# Patient Record
Sex: MALE | Race: BLACK OR AFRICAN AMERICAN
[De-identification: names, ages, dates, MRNs, and addresses within clinical notes are randomized per-mention and may not be internally consistent; named-entity substitution may affect disease eponyms.]

---

## 2019-07-12 ENCOUNTER — HOSPITAL ENCOUNTER (INPATIENT)
Dept: HOSPITAL 83 - ED | Age: 74
LOS: 3 days | Discharge: HOME | DRG: 308 | End: 2019-07-15
Attending: INTERNAL MEDICINE | Admitting: INTERNAL MEDICINE
Payer: MEDICARE

## 2019-07-12 VITALS — DIASTOLIC BLOOD PRESSURE: 90 MMHG

## 2019-07-12 VITALS — DIASTOLIC BLOOD PRESSURE: 76 MMHG

## 2019-07-12 VITALS — WEIGHT: 208 LBS | HEIGHT: 71 IN | BODY MASS INDEX: 29.12 KG/M2

## 2019-07-12 VITALS — DIASTOLIC BLOOD PRESSURE: 78 MMHG

## 2019-07-12 VITALS — DIASTOLIC BLOOD PRESSURE: 72 MMHG | SYSTOLIC BLOOD PRESSURE: 115 MMHG

## 2019-07-12 VITALS — SYSTOLIC BLOOD PRESSURE: 104 MMHG | DIASTOLIC BLOOD PRESSURE: 68 MMHG

## 2019-07-12 VITALS — DIASTOLIC BLOOD PRESSURE: 72 MMHG

## 2019-07-12 DIAGNOSIS — I07.1: ICD-10-CM

## 2019-07-12 DIAGNOSIS — I48.91: Primary | ICD-10-CM

## 2019-07-12 DIAGNOSIS — I13.0: ICD-10-CM

## 2019-07-12 DIAGNOSIS — E83.51: ICD-10-CM

## 2019-07-12 DIAGNOSIS — E83.41: ICD-10-CM

## 2019-07-12 DIAGNOSIS — N18.3: ICD-10-CM

## 2019-07-12 DIAGNOSIS — D64.9: ICD-10-CM

## 2019-07-12 DIAGNOSIS — E80.6: ICD-10-CM

## 2019-07-12 DIAGNOSIS — Z90.79: ICD-10-CM

## 2019-07-12 DIAGNOSIS — Z79.82: ICD-10-CM

## 2019-07-12 DIAGNOSIS — I42.9: ICD-10-CM

## 2019-07-12 DIAGNOSIS — Z87.891: ICD-10-CM

## 2019-07-12 DIAGNOSIS — E78.5: ICD-10-CM

## 2019-07-12 DIAGNOSIS — Z71.41: ICD-10-CM

## 2019-07-12 DIAGNOSIS — Z82.3: ICD-10-CM

## 2019-07-12 DIAGNOSIS — R73.9: ICD-10-CM

## 2019-07-12 DIAGNOSIS — D72.810: ICD-10-CM

## 2019-07-12 DIAGNOSIS — F10.10: ICD-10-CM

## 2019-07-12 DIAGNOSIS — I50.21: ICD-10-CM

## 2019-07-12 DIAGNOSIS — Z82.49: ICD-10-CM

## 2019-07-12 DIAGNOSIS — Z79.899: ICD-10-CM

## 2019-07-12 LAB
ALBUMIN SERPL-MCNC: 4 GM/DL (ref 3.1–4.5)
ALP SERPL-CCNC: 79 U/L (ref 45–117)
ALT SERPL W P-5'-P-CCNC: 34 U/L (ref 12–78)
APTT PPP: 24.7 SECONDS (ref 20–32.1)
AST SERPL-CCNC: 30 IU/L (ref 3–35)
BASOPHILS # BLD AUTO: 0 10*3/UL (ref 0–0.1)
BASOPHILS NFR BLD AUTO: 0.5 % (ref 0–1)
BUN SERPL-MCNC: 23 MG/DL (ref 7–24)
CHLORIDE SERPL-SCNC: 111 MMOL/L (ref 98–107)
CREAT SERPL-MCNC: 1.51 MG/DL (ref 0.7–1.3)
EOSINOPHIL # BLD AUTO: 0.1 10*3/UL (ref 0–0.4)
EOSINOPHIL # BLD AUTO: 1.1 % (ref 1–4)
ERYTHROCYTE [DISTWIDTH] IN BLOOD BY AUTOMATED COUNT: 13.6 % (ref 0–14.5)
HCT VFR BLD AUTO: 38.4 % (ref 42–52)
HGB BLD-MCNC: 13 G/DL (ref 14–18)
INR BLD: 0.9 (ref 2–3.5)
LIPASE SERPL-CCNC: 173 U/L (ref 73–393)
LYMPHOCYTES # BLD AUTO: 1.1 10*3/UL (ref 1.3–4.4)
LYMPHOCYTES NFR BLD AUTO: 17.1 % (ref 27–41)
MCH RBC QN AUTO: 30.9 PG (ref 27–31)
MCHC RBC AUTO-ENTMCNC: 33.9 G/DL (ref 33–37)
MCV RBC AUTO: 91.2 FL (ref 80–94)
MONOCYTES # BLD AUTO: 0.8 10*3/UL (ref 0.1–1)
MONOCYTES NFR BLD MANUAL: 13 % (ref 3–9)
NEUT #: 4.2 10*3/UL (ref 2.3–7.9)
NEUT %: 68 % (ref 47–73)
NRBC BLD QL AUTO: 0 10*3/UL (ref 0–0)
PLATELET # BLD AUTO: 171 10*3/UL (ref 130–400)
PMV BLD AUTO: 10.6 FL (ref 9.6–12.3)
POTASSIUM SERPL-SCNC: 4 MMOL/L (ref 3.5–5.1)
PROT SERPL-MCNC: 7.1 GM/DL (ref 6.4–8.2)
RBC # BLD AUTO: 4.21 10*6/UL (ref 4.5–5.9)
SODIUM SERPL-SCNC: 142 MMOL/L (ref 136–145)
TROPONIN I SERPL-MCNC: < 0.015 NG/ML (ref ?–0.04)
WBC NRBC COR # BLD AUTO: 6.2 10*3/UL (ref 4.8–10.8)

## 2019-07-12 NOTE — NUR
PT WAS UP TO BATHROOM MONITOR TECH CALLED AND STATED THAT PT HR -160
FOR S SHORT PERIOD OF TIME. BY THE TIME THE RN GOT TO THE ROOM TO CHECK ON
PATIENT, PT WAS BACK IN BED. -130
PT STASTES ASYMPTOMATIC AT THIS TIME. WILL NOTIFY PHYSICIAN

## 2019-07-12 NOTE — PR
[place], [place]
 
                                      PROGRESS NOTE
 
        NAME: SERENA FRAUSTO               ACCT #: [id number]  
        UNIT #: [id number]                       ROOM: 425       
        DOCTOR: NATTY HAMLIN,SCOTTIE          BIRTHDATE: [birth year]
 
 
DOS: 07/14/2019
 
REASON FOR VISIT:  Atrial fibrillation and heart failure.
 
SUBJECTIVE:  The patient is feeling better.  Denies any chest pain, shortness of
breath.  No palpitation, dizziness, no syncope, no orthopnea.  No nausea,
vomiting, diarrhea.  No fever and chills.  No PND.
 
REVIEW OF SYSTEMS:  Review of 10 systems negative except as mentioned above.
 
RHYTHM STRIPS:  The patient is in atrial fibrillation with mostly controlled
ventricular rates.
 
PHYSICAL EXAMINATION:
VITAL SIGNS:  Blood pressure 142/90, pulse 68, respiratory rate is 20.  Weight
94.3 kilos.
GENERAL:  Alert, comfortable, in no acute distress.
HEENT:  Pupils are round and equal.  No jaundice.  Tongue was moist and pharynx
was clear.
NECK:  Supple, no distended neck veins, no carotid bruit.
CHEST:  Symmetrical, nontender.
LUNGS:  Good air entry bilaterally.
HEART:  Irregularly irregular, grade 1/6 systolic murmur.
ABDOMEN:  Benign, nontender.  Bowel sounds normal.
EXTREMITIES:  Showed no edema.  Distal pulses palpable.
SKIN:  Warm and dry.  No cyanosis, no clubbing.
RECTAL:  Deferred.
GENITOURINARY:  Deferred.
NEUROLOGIC:  The patient is alert with no focal neurologic deficit.
MUSCULOSKELETAL:  No joint tenderness or swelling.
 
MEDICATIONS AND LABORATORY DATA:  Reviewed.
 
IMPRESSION:
1.  Atrial fibrillation with rapid ventricular rate, new onset.
2.  Acute systolic heart failure.
3.  Chronic kidney disease.
4.  Hypertension.
5.  Alcohol abuse.
 
RECOMMENDATIONS:
1.  Continue current medications.
2.  Risk factor modification for diet, exercise, weight loss as well as to quit
drinking discussed.
3.  Lexiscan stress test was scheduled for tomorrow to rule out ischemia.
4.  If the patient remains in atrial fibrillation, then a LETICIA and DC
cardioversion were discussed for tomorrow and the risks, benefits, and
alternatives were discussed and the patient is agreeable.
5.  Continue the current cardiac medications.
6.  No family at bedside at time of my examination.
 
                              [place], [place]
 
                                      PROGRESS NOTE
 
        NAME: SERENA FRAUSTO               ACCT #: [id number]  
        UNIT #: [id number]                       ROOM: 425       
        DOCTOR: SCOTTIE LUZ MD          BIRTHDATE: [birth year]
 
 
 
 
 
_________________________________
SCOTTIE LUZ MD
 
CM:PNTRANS
 
D: 07/14/19 1927                 
T: 07/15/19 0209
             
                                                            
SCOTTIE LUZ MD          
                                                            
07/15/19
0207
                              
interface

## 2019-07-12 NOTE — EKG
[place], [place]
 
                               ELECTROCARDIOGRAM REPORT
 
        NAME: SERENA FRAUSTO                ACCT #: [id number]  
        UNIT #: [id number]                        ROOM: 425       
        DOCTOR: HOMER DRAFT REPORT          BIRTHDATE: [birth year]
 
 
 

 
 
                           [facility]
                                       
Test Date:    2019               Test Time:    16:35:18
Pat Name:     SERNEA FRAUSTO          Department:   
Patient ID:   [id number]             Room:         425
Gender:       M                        Technician:   Betina Mercado
:          1945               Requested By: YAIR HIGGINS
Order Number: [id number]      Reading MD:   Devante Ye MD
                                 Measurements
Intervals                              Axis          
Rate:         120                      P:            
PA:                                    QRS:          -53
QRSD:         98                       T:            31
QT:           335                                    
QTc:          474                                    
                           Interpretive Statements
Atrial fibrillation
Abnormal R-wave progression, late transition
Inferior infarct, old
Compared to ECG 2019 10:03:23
No significant changes
 
Electronically Signed On 2019 15:52:41 PDT by Devante Ye MD
 
CM:EKGRPT:ELECTROCARDIOGRAM REPORT
 
D: 19 1635
T: 19 1552
    
YAIR BUSH DRAFT REPORT         
YAIR HIGGINS DO

## 2019-07-12 NOTE — PR
[place], [place]
 
                                      PROGRESS NOTE
 
        NAME: SERENA FRAUSTO               ACCT #: [id number]  
        UNIT #: [id number]                       ROOM: 425       
        DOCTOR: SCOTTIE LUZ MD          BIRTHDATE: [birth year]
 
 
DOS: 07/13/2019
 
CARDIOLOGY FOLLOWUP VISIT
 
REASON FOR VISIT:  Atrial fibrillation and cardiomyopathy.
 
HISTORY OF PRESENT ILLNESS:  The patient is feeling better.  Denies any chest
pain, shortness of breath.  No palpitation, no dizziness, no PND, no orthopnea. 
No nausea, vomiting or diarrhea.
 
REVIEW OF SYSTEMS:  Review of 10 systems negative except as mentioned above.
 
PHYSICAL EXAMINATION:
VITAL SIGNS:  Blood pressure 130/60, pulse 100, respiratory rate was 18, weight
94.3 kg, BMI 29.
GENERAL:  Alert, comfortable, in no acute distress.
HEAD AND NECK:  Neck supple, no distended neck veins, no carotid bruit.
CHEST:  Symmetrical, nontender.
LUNGS:  Few scattered rhonchi.
HEART:  Irregularly irregular.  Grade 1/6 systolic murmur.
ABDOMEN:  Benign, nontender.  Bowel sounds normal.
EXTREMITIES:  Showed trace edema.  Distal pulses palpable.
SKIN:  Warm and dry.  No cyanosis, no clubbing.
RECTAL:  Deferred.
GENITOURINARY:  Deferred.
NEUROLOGIC:  The patient is alert with no focal neurologic deficit.
 
MEDICATIONS AND LABORATORIES:  Reviewed.
 
RHYTHM STRIPS:  The patient in atrial fibrillation.
 
IMPRESSION:
1.  Atrial fibrillation with rapid ventricular rate, new onset, currently rate
controlled.  Thyroid function tests are normal.  The patient was on Eliquis
anticoagulation.
2.  Acute systolic heart failure, currently stable.
3.  Hypertension.
4.  Chronic kidney disease.
5.  History of alcohol use.
 
RECOMMENDATIONS:
1.  Continue current medications.
2.  Risk factor modification discussed.  Lexiscan stress was scheduled for
Monday.
3.  The patient still in atrial fibrillation.  I would consider LETICIA-guided DC
cardioversion on Monday if schedule permits.
4.  No family at bedside.
5.  Above recommendation discussed with the patient.
 
 
 
                              [place], [place]
 
                                      PROGRESS NOTE
 
        NAME: SERENA FRAUSTO               ACCT #: [id number]  
        UNIT #: [id number]                       ROOM: 425       
        DOCTOR: SCOTTIE LUZ MD          BIRTHDATE: [birth year]
 
 
 
_________________________________
SCOTTIE LUZ MD
 
CM:MISSY
 
D: 07/13/19 2005                 
T: 07/14/19 0342
             
                                                            
SCOTTIE LUZ MD          
                                                            
07/14/19
0341
                              
interface

## 2019-07-12 NOTE — NUR
ASSUMED CARE OF PATIENT. PATIENT RESTING IN HIS BED AT THIS TIME. ALERT AND
ORIENTED. NO S/S OF DISTRESS. CALL LIGHT PLACED WITHIN REACH.

## 2019-07-12 NOTE — EKG
[place], [place]
 
                               ELECTROCARDIOGRAM REPORT
 
        NAME: SERENA FRAUSTO                ACCT #: [id number]  
        UNIT #: [id number]                        ROOM: 425       
        DOCTOR: HOMER DRAFT REPORT          BIRTHDATE: [birth year]
 
 
 

 
 
                           [facility]
                                       
Test Date:    2019               Test Time:    10:03:23
Pat Name:     SERENA FRAUSTO          Department:   
Patient ID:   [id number]             Room:         425
Gender:       M                        Technician:   Betina Mercado
:          1945               Requested By: YAIR HIGGINS
Order Number: [id number]      Reading MD:   Devante Ye MD
                                 Measurements
Intervals                              Axis          
Rate:         126                      P:            
DE:                                    QRS:          -46
QRSD:         99                       T:            43
QT:           328                                    
QTc:          475                                    
                           Interpretive Statements
Atrial fibrillation
Abnormal R-wave progression, late transition
Inferior infarct, old
No previous ECG available for comparison
 
Electronically Signed On 2019 13:25:40 PDT by Devante Ye MD
 
CM:EKGRPT:ELECTROCARDIOGRAM REPORT
 
D: 19 1003
T: 19 1325
    
YAIR BUSH DRAFT REPORT         
YAIR HIGGINS DO

## 2019-07-12 NOTE — O
[place], [place]
 
                                      OPERATIVE NOTE
 
        NAME: SERENA FRAUSTO                ACCT #: [id number]  
        UNIT #: [id number]                        ROOM: 425       
        DOCTOR: NATTY HAMLIN,SCOTTIE           BIRTHDATE: [birth year]
 
 
DOS: 07/15/2019
 
PROCEDURE:  DC cardioversion.
 
PREOPERATIVE DIAGNOSIS:  Atrial fibrillation.
 
POSTOPERATIVE DIAGNOSIS:  Atrial fibrillation.
 
IMMEDIATE COMPLICATIONS:  None.
 
SEDATION:  LMAC sedation.
 
CLINICAL HISTORY:  The patient is scheduled for LETICIA cardioversion due to his
symptomatic atrial fibrillation with rapid ventricular rate.  The patient was
anticoagulated with Eliquis.  Risks, complications, and alternatives were
discussed and informed consent obtained.
 
OPERATIVE REPORT:  The patient was brought to the operative room.  He underwent
a LETICIA to rule out intracardiac thrombus.  After sedation via anterior and
posterior cardioversion patches, the patient is at 200 joules of synchronized
biphasic current and was converted to sinus rhythm and maintained in sinus
rhythm.  The patient's heart rate, heart rhythm, and blood pressures were
closely monitored.  Post-cardioversion, the patient was alert and oriented. 
Vital signs were stable and no focal neurologic deficit.
 
CONCLUSIONS:  Successful conversion of atrial fibrillation to sinus rhythm with
single attempt of 200 joules of synchronized biphasic direct current.
 
 
 
_________________________________
SCOTTIE LUZ MD
 
CM:OPRECORD:OPERATIVE NOTE
 
D: 07/15/19 1246
T: 07/15/19 1259
    
                                                            
SCOTTIE LUZ MD          
                                                            
07/15/19
1257
                              
interface

## 2019-07-12 NOTE — NUR
A 73, admitted to , under the
services of SAI Carballo DO with a diagnosis of ATRIAL FIB.
Chief complaint is NO COMPLAINTS.
Patient arrived via stretcher from ER.
Monitor applied. Initial assessment completed.
Vital signs taken and recorded.
SAI CARBALLO DO notified of admission to the unit.
Orders received.
See assessment for past medical history, medications
and allergies.
Patient and/or family oriented to unit. [facility] reviewed.
Clothing/patient valuable form completed.
 
TERRY SALGUERO

## 2019-07-12 NOTE — ST
[place], [place]
 
                               EXERCISE STRESS TEST REPORT
 
        [facility]: [facility]ERENA FRAUSTO                [id number]  
        UNIT #: [id number]                        ROOM: 425       
        DOCTOR: NATTY HAMLIN,SCOTTIE           BIRTHDATE: [birth year]
 
 
DOS: 07/15/2019
 
LEXISCAN STRESS TEST
 
REASON FOR TEST:  Atrial fibrillation.
 
PHYSICAL EXAMINATION:
NECK:  Supple.
LUNGS:  Clear anteriorly.
HEART:  Irregular.
 
PROTOCOL:  Lexiscan protocol.  Maximum heart rate 142, peak blood pressure
108/52.
 
SYMPTOMS:  The patient is chest pain free.
 
EKG:  Resting EKG shows atrial fibrillation.  Stress EKG showed atrial
fibrillation with rapid ventricular rate.  No ischemia.
 
CONCLUSION:  Clinically, the patient is chest pain free.  EKG nonischemic,
underlying atrial fibrillation.
 
POST-STRESS COMPLICATIONS:  None.
 
The patient received a total of 0.4 mg of Lexiscan.
 
 
 
_________________________________
SCOTTIE LUZ MD
 
CM:STRESS:EXERCISE STRESS TEST REPORT 
 
 
D: 07/15/19 1247
T: 07/15/19 1327
    
                                                            
SCOTTIE LUZ MD

## 2019-07-12 NOTE — PR
[place], [place]
 
                                      PROGRESS NOTE
 
        NAME: SERENA FRAUSTO               ACCT #: [id number]  
        UNIT #: [id number]                       ROOM: 425       
        DOCTOR: NATTY HAMLIN,SCOTTIE          BIRTHDATE: [birth year]
 
 
DOS: 07/15/2019
 
REASON FOR VISIT:  Atrial fibrillation, heart failure.
 
SUBJECTIVE:  The patient is feeling better.  Denies any chest pain, shortness of
breath.  No palpitation or dizziness.  No PND, no orthopnea.  No nausea,
vomiting, diarrhea.  No fever and chills.  No musculoskeletal symptoms.  No
neurologic symptoms.  No genitourinary symptoms.
 
REVIEW OF SYSTEMS:  Review of 10 systems negative, except as mentioned above.
 
RHYTHM STRIPS:  The patient with atrial fibrillation with occasional rapid
ventricular rate.
 
PHYSICAL EXAMINATION:
VITAL SIGNS:  Blood pressure 120/87, pulse 72, respiratory rate 18, weight 90
kilos.
GENERAL:  Alert, comfortable, in no acute distress.
HEENT:  Pupils are round and equal.  No jaundice.
NECK:  Supple, no distended neck veins, no carotid bruit.
CHEST:  Symmetrical, nontender.
LUNGS:  Clear to auscultation bilaterally.
HEART:  Irregularly irregular, grade 1/6 systolic murmur.
ABDOMEN:  Benign, nontender.  Bowel sounds normal.
EXTREMITIES:  Showed no edema.  Distal pulses palpable.
SKIN:  Warm and dry.  No cyanosis, no clubbing.
RECTAL:  Deferred.
GENITOURINARY:  Deferred.
NEUROLOGIC:  The patient is alert with no focal neurologic deficit.
 
LABORATORY DATA:  Labs and medications reviewed.  Hemoglobin 12.9, creatinine
1.55.
 
IMPRESSION:
1.  New-onset atrial fibrillation with rapid ventricular rate.
2.  Acute systolic heart failure with ejection fraction 40-45% by echo.
3.  Hypertension.
4.  Chronic kidney disease.
5.  Alcohol use.
 
RECOMMENDATIONS:
1.  Continue current medication.
2.  Lexiscan stress test today to rule out ischemia.
3.  LETICIA, DC cardioversion later today due to his atrial fibrillation with rapid
ventricular rate.  
4.  Risks, benefits, and complications of LETICIA and cardioversion discussed and
the patient is agreeable. 
5.  If the stress test is unremarkable and if this cardioversion is successful,
possible discharge later today.
 
 
                              [place], [place]
 
                                      PROGRESS NOTE
 
        NAME: SERENA FRAUSTO               ACCT #: [id number]  
        UNIT #: [id number]                       ROOM: 425       
        DOCTOR: NATTY HAMLIN,SCOTTIE          BIRTHDATE: [birth year]
 
 
No family at bedside at the time of examination.
 
 
 
_________________________________
SCOTTIE LUZ MD
 
CM:PNTRANS
 
D: 07/15/19 1740                 
T: 07/16/19 0111
             
                                                            
SCOTTIE LUZ MD          
                                                            
07/16/19
1613
                              
interface

## 2019-07-12 NOTE — EKG
[place], [place]
 
                               ELECTROCARDIOGRAM REPORT
 
        NAME: SERENA FRAUSTO                ACCT #: [id number]  
        UNIT #: [id number]                        ROOM: 425       
        DOCTOR: HOMER DRAFT REPORT          BIRTHDATE: [birth year]
 
 
 

 
 
                           [facility]
                                       
Test Date:    2019               Test Time:    14:04:24
Pat Name:     SERENA FRAUSTO          Department:   
Patient ID:   [id number]             Room:         425
Gender:       M                        Technician:   Betina Mercado
:          1945               Requested By: YAIR HIGGINS
Order Number: [id number]      Reading MD:   Devante Ye MD
                                 Measurements
Intervals                              Axis          
Rate:         112                      P:            
NE:                                    QRS:          -47
QRSD:         97                       T:            46
QT:           341                                    
QTc:          466                                    
                           Interpretive Statements
Atrial fibrillation
Inferior infarct, old
No previous ECG available for comparison
 
Electronically Signed On 2019 15:52:30 PDT by Devante Ye MD
 
CM:EKGRPT:ELECTROCARDIOGRAM REPORT
 
D: 19 1404
T: 19 1552
    
YAIR BUSH DRAFT REPORT         
YAIR HIGGINS DO

## 2019-07-13 VITALS — DIASTOLIC BLOOD PRESSURE: 85 MMHG | SYSTOLIC BLOOD PRESSURE: 139 MMHG

## 2019-07-13 VITALS — DIASTOLIC BLOOD PRESSURE: 60 MMHG

## 2019-07-13 VITALS — DIASTOLIC BLOOD PRESSURE: 68 MMHG

## 2019-07-13 VITALS — DIASTOLIC BLOOD PRESSURE: 60 MMHG | SYSTOLIC BLOOD PRESSURE: 132 MMHG

## 2019-07-13 VITALS — DIASTOLIC BLOOD PRESSURE: 62 MMHG | SYSTOLIC BLOOD PRESSURE: 119 MMHG

## 2019-07-13 VITALS — DIASTOLIC BLOOD PRESSURE: 79 MMHG

## 2019-07-13 LAB
BASOPHILS # BLD AUTO: 0 10*3/UL (ref 0–0.1)
BASOPHILS NFR BLD AUTO: 0.7 % (ref 0–1)
BUN SERPL-MCNC: 21 MG/DL (ref 7–24)
CHLORIDE SERPL-SCNC: 110 MMOL/L (ref 98–107)
CHOLEST SERPL-MCNC: 205 MG/DL (ref ?–200)
CREAT SERPL-MCNC: 1.36 MG/DL (ref 0.7–1.3)
EOSINOPHIL # BLD AUTO: 0.1 10*3/UL (ref 0–0.4)
EOSINOPHIL # BLD AUTO: 2.4 % (ref 1–4)
ERYTHROCYTE [DISTWIDTH] IN BLOOD BY AUTOMATED COUNT: 13.8 % (ref 0–14.5)
HCT VFR BLD AUTO: 39.2 % (ref 42–52)
HDLC SERPL-MCNC: 97 MG/DL (ref 40–60)
HGB BLD-MCNC: 13.1 G/DL (ref 14–18)
LDLC SERPL DIRECT ASSAY-MCNC: 87 MG/DL (ref 9–159)
LYMPHOCYTES # BLD AUTO: 1.1 10*3/UL (ref 1.3–4.4)
LYMPHOCYTES NFR BLD AUTO: 20.9 % (ref 27–41)
MCH RBC QN AUTO: 30.8 PG (ref 27–31)
MCHC RBC AUTO-ENTMCNC: 33.4 G/DL (ref 33–37)
MCV RBC AUTO: 92.2 FL (ref 80–94)
MONOCYTES # BLD AUTO: 0.7 10*3/UL (ref 0.1–1)
MONOCYTES NFR BLD MANUAL: 13.5 % (ref 3–9)
NEUT #: 3.4 10*3/UL (ref 2.3–7.9)
NEUT %: 62.1 % (ref 47–73)
NRBC BLD QL AUTO: 0 % (ref 0–0)
PHOSPHATE SERPL-MCNC: 3.8 MG/DL (ref 2.5–4.9)
PLATELET # BLD AUTO: 172 10*3/UL (ref 130–400)
PMV BLD AUTO: 10.9 FL (ref 9.6–12.3)
POTASSIUM SERPL-SCNC: 3.9 MMOL/L (ref 3.5–5.1)
RBC # BLD AUTO: 4.25 10*6/UL (ref 4.5–5.9)
SODIUM SERPL-SCNC: 143 MMOL/L (ref 136–145)
T4 FREE SERPL-MCNC: 0.87 NG/DL (ref 0.76–1.46)
TRIGL SERPL-MCNC: 104 MG/DL (ref ?–150)
TSH SERPL DL<=0.005 MIU/L-ACNC: 0.86 UIU/ML (ref 0.36–4.75)
VITAMIN B12: 411 PG/ML (ref 247–911)
VLDLC SERPL CALC-MCNC: 21 MG/DL (ref 6–40)
WBC NRBC COR # BLD AUTO: 5.4 10*3/UL (ref 4.8–10.8)

## 2019-07-14 VITALS — SYSTOLIC BLOOD PRESSURE: 115 MMHG | DIASTOLIC BLOOD PRESSURE: 62 MMHG

## 2019-07-14 VITALS — SYSTOLIC BLOOD PRESSURE: 115 MMHG | DIASTOLIC BLOOD PRESSURE: 80 MMHG

## 2019-07-14 VITALS — SYSTOLIC BLOOD PRESSURE: 138 MMHG | DIASTOLIC BLOOD PRESSURE: 60 MMHG

## 2019-07-14 VITALS — DIASTOLIC BLOOD PRESSURE: 83 MMHG

## 2019-07-14 VITALS — DIASTOLIC BLOOD PRESSURE: 90 MMHG

## 2019-07-14 LAB
BASOPHILS # BLD AUTO: 0 10*3/UL (ref 0–0.1)
BASOPHILS NFR BLD AUTO: 0.7 % (ref 0–1)
BUN SERPL-MCNC: 20 MG/DL (ref 7–24)
CHLORIDE SERPL-SCNC: 113 MMOL/L (ref 98–107)
CREAT SERPL-MCNC: 1.27 MG/DL (ref 0.7–1.3)
EOSINOPHIL # BLD AUTO: 0.1 10*3/UL (ref 0–0.4)
EOSINOPHIL # BLD AUTO: 2.2 % (ref 1–4)
ERYTHROCYTE [DISTWIDTH] IN BLOOD BY AUTOMATED COUNT: 13.9 % (ref 0–14.5)
HCT VFR BLD AUTO: 37.3 % (ref 42–52)
HGB BLD-MCNC: 12.7 G/DL (ref 14–18)
LYMPHOCYTES # BLD AUTO: 1.2 10*3/UL (ref 1.3–4.4)
LYMPHOCYTES NFR BLD AUTO: 19.7 % (ref 27–41)
MCH RBC QN AUTO: 31.2 PG (ref 27–31)
MCHC RBC AUTO-ENTMCNC: 34 G/DL (ref 33–37)
MCV RBC AUTO: 91.6 FL (ref 80–94)
MONOCYTES # BLD AUTO: 0.8 10*3/UL (ref 0.1–1)
MONOCYTES NFR BLD MANUAL: 13.3 % (ref 3–9)
NEUT #: 3.8 10*3/UL (ref 2.3–7.9)
NEUT %: 63.8 % (ref 47–73)
NRBC BLD QL AUTO: 0 10*3/UL (ref 0–0)
PLATELET # BLD AUTO: 172 10*3/UL (ref 130–400)
PMV BLD AUTO: 11.2 FL (ref 9.6–12.3)
POTASSIUM SERPL-SCNC: 3.9 MMOL/L (ref 3.5–5.1)
RBC # BLD AUTO: 4.07 10*6/UL (ref 4.5–5.9)
SODIUM SERPL-SCNC: 145 MMOL/L (ref 136–145)
WBC NRBC COR # BLD AUTO: 5.9 10*3/UL (ref 4.8–10.8)

## 2019-07-14 NOTE — NUR
Patient resting quietly with no c/o discomfort. Respirations easy and regular.
Vital signs stable. No overt distress.
ZACH MARTIN

## 2019-07-15 VITALS — SYSTOLIC BLOOD PRESSURE: 99 MMHG | DIASTOLIC BLOOD PRESSURE: 61 MMHG

## 2019-07-15 VITALS — DIASTOLIC BLOOD PRESSURE: 51 MMHG | SYSTOLIC BLOOD PRESSURE: 92 MMHG

## 2019-07-15 VITALS — DIASTOLIC BLOOD PRESSURE: 55 MMHG

## 2019-07-15 VITALS — DIASTOLIC BLOOD PRESSURE: 87 MMHG | SYSTOLIC BLOOD PRESSURE: 120 MMHG

## 2019-07-15 VITALS — SYSTOLIC BLOOD PRESSURE: 110 MMHG | DIASTOLIC BLOOD PRESSURE: 62 MMHG

## 2019-07-15 LAB
BASOPHILS # BLD AUTO: 0 10*3/UL (ref 0–0.1)
BASOPHILS NFR BLD AUTO: 0.5 % (ref 0–1)
BUN SERPL-MCNC: 25 MG/DL (ref 7–24)
CHLORIDE SERPL-SCNC: 112 MMOL/L (ref 98–107)
CREAT SERPL-MCNC: 1.55 MG/DL (ref 0.7–1.3)
EOSINOPHIL # BLD AUTO: 0.1 10*3/UL (ref 0–0.4)
EOSINOPHIL # BLD AUTO: 2 % (ref 1–4)
ERYTHROCYTE [DISTWIDTH] IN BLOOD BY AUTOMATED COUNT: 13.9 % (ref 0–14.5)
HCT VFR BLD AUTO: 38.5 % (ref 42–52)
HGB BLD-MCNC: 12.9 G/DL (ref 14–18)
LYMPHOCYTES # BLD AUTO: 1.1 10*3/UL (ref 1.3–4.4)
LYMPHOCYTES NFR BLD AUTO: 17.6 % (ref 27–41)
MCH RBC QN AUTO: 30.7 PG (ref 27–31)
MCHC RBC AUTO-ENTMCNC: 33.5 G/DL (ref 33–37)
MCV RBC AUTO: 91.7 FL (ref 80–94)
MONOCYTES # BLD AUTO: 0.7 10*3/UL (ref 0.1–1)
MONOCYTES NFR BLD MANUAL: 12.1 % (ref 3–9)
NEUT #: 4.1 10*3/UL (ref 2.3–7.9)
NEUT %: 67.5 % (ref 47–73)
NRBC BLD QL AUTO: 0 10*3/UL (ref 0–0)
PLATELET # BLD AUTO: 183 10*3/UL (ref 130–400)
PMV BLD AUTO: 11.1 FL (ref 9.6–12.3)
POTASSIUM SERPL-SCNC: 3.8 MMOL/L (ref 3.5–5.1)
RBC # BLD AUTO: 4.2 10*6/UL (ref 4.5–5.9)
SODIUM SERPL-SCNC: 143 MMOL/L (ref 136–145)
WBC NRBC COR # BLD AUTO: 6.1 10*3/UL (ref 4.8–10.8)

## 2019-07-15 PROCEDURE — B24BZZ4 ULTRASONOGRAPHY OF HEART WITH AORTA, TRANSESOPHAGEAL: ICD-10-PCS | Performed by: INTERNAL MEDICINE

## 2019-07-15 PROCEDURE — 3E073KZ INTRODUCTION OF OTHER DIAGNOSTIC SUBSTANCE INTO CORONARY ARTERY, PERCUTANEOUS APPROACH: ICD-10-PCS | Performed by: INTERNAL MEDICINE

## 2019-07-15 PROCEDURE — 5A2204Z RESTORATION OF CARDIAC RHYTHM, SINGLE: ICD-10-PCS | Performed by: INTERNAL MEDICINE

## 2019-07-15 PROCEDURE — 4A02XM4 MEASUREMENT OF CARDIAC TOTAL ACTIVITY, EXTERNAL APPROACH: ICD-10-PCS | Performed by: INTERNAL MEDICINE

## 2019-07-15 NOTE — NUR
in to talk to patient.
Patient states lives at home with wife.
There are few steps in the home.
Physician: yuki bryan
Pharmacy: [facility]
Home health services: none
Patient's level of ADLs: INDEPENDENT
Patient has working utilities: all workingnone
DME: none
Follow-up physician's appointment after d/c: will be made by hospitalist nurse
director upon discharge
Does patient want to access PORTAL?: no
Discharge plan discussed with patient patient lives at home with wife, stated
he was independent in adls and ambulation, patient states he will be going home
when able and denies any home need.
 
HUMBERTO PALM

## 2019-07-15 NOTE — NUR
Discharge instructions reviewed with patient/family. Patient receptive and
verbalizes understanding. Follow-up care arranged. Written instructions given
to patient/family.
DEBBIE MACKAY

## 2019-07-16 LAB
LV EF: 43 %
LV EF: 45 %
LVEF MODALITY: NORMAL
LVEF MODALITY: NORMAL

## 2020-03-11 DIAGNOSIS — I10 HYPERTENSION, UNSPECIFIED TYPE: ICD-10-CM

## 2020-03-11 DIAGNOSIS — I48.91 ATRIAL FIBRILLATION, UNSPECIFIED TYPE (HCC): ICD-10-CM

## 2020-03-11 RX ORDER — LOSARTAN POTASSIUM 100 MG/1
100 TABLET ORAL DAILY
COMMUNITY

## 2020-03-11 RX ORDER — ATORVASTATIN CALCIUM 10 MG/1
10 TABLET, FILM COATED ORAL DAILY
COMMUNITY

## 2020-03-11 RX ORDER — ACETAMINOPHEN 160 MG
1 TABLET,DISINTEGRATING ORAL DAILY
COMMUNITY

## 2020-03-11 RX ORDER — AMLODIPINE BESYLATE 10 MG/1
10 TABLET ORAL DAILY
COMMUNITY

## 2020-03-12 ENCOUNTER — OFFICE VISIT (OUTPATIENT)
Dept: CARDIOLOGY CLINIC | Age: 75
End: 2020-03-12
Payer: MEDICARE

## 2020-03-12 VITALS
SYSTOLIC BLOOD PRESSURE: 134 MMHG | HEART RATE: 60 BPM | WEIGHT: 225 LBS | HEIGHT: 71 IN | DIASTOLIC BLOOD PRESSURE: 78 MMHG | BODY MASS INDEX: 31.5 KG/M2 | RESPIRATION RATE: 18 BRPM

## 2020-03-12 PROCEDURE — G8417 CALC BMI ABV UP PARAM F/U: HCPCS | Performed by: INTERNAL MEDICINE

## 2020-03-12 PROCEDURE — 3017F COLORECTAL CA SCREEN DOC REV: CPT | Performed by: INTERNAL MEDICINE

## 2020-03-12 PROCEDURE — 4040F PNEUMOC VAC/ADMIN/RCVD: CPT | Performed by: INTERNAL MEDICINE

## 2020-03-12 PROCEDURE — 1123F ACP DISCUSS/DSCN MKR DOCD: CPT | Performed by: INTERNAL MEDICINE

## 2020-03-12 PROCEDURE — 99214 OFFICE O/P EST MOD 30 MIN: CPT | Performed by: INTERNAL MEDICINE

## 2020-03-12 PROCEDURE — 1036F TOBACCO NON-USER: CPT | Performed by: INTERNAL MEDICINE

## 2020-03-12 PROCEDURE — 93000 ELECTROCARDIOGRAM COMPLETE: CPT | Performed by: INTERNAL MEDICINE

## 2020-03-12 PROCEDURE — G8427 DOCREV CUR MEDS BY ELIG CLIN: HCPCS | Performed by: INTERNAL MEDICINE

## 2020-03-12 PROCEDURE — G8482 FLU IMMUNIZE ORDER/ADMIN: HCPCS | Performed by: INTERNAL MEDICINE

## 2020-03-12 RX ORDER — APIXABAN 5 MG/1
1 TABLET, FILM COATED ORAL DAILY
COMMUNITY
Start: 2020-03-09 | End: 2020-03-12 | Stop reason: SDUPTHER

## 2020-03-12 RX ORDER — ASPIRIN 81 MG/1
1 TABLET, COATED ORAL DAILY
COMMUNITY
Start: 2020-01-30 | End: 2020-03-12 | Stop reason: SDUPTHER

## 2020-03-12 RX ORDER — SIMVASTATIN 40 MG
1 TABLET ORAL
COMMUNITY
Start: 2019-12-26 | End: 2020-08-18

## 2020-03-12 RX ORDER — APIXABAN 5 MG/1
5 TABLET, FILM COATED ORAL 2 TIMES DAILY
Qty: 180 TABLET | Refills: 3 | Status: SHIPPED | OUTPATIENT
Start: 2020-03-12

## 2020-03-12 NOTE — PROGRESS NOTES
OFFICE VISIT     PRIMARY CARE PHYSICIAN:      Radha Alamo       ALLERGIES / SENSITIVITIES:      No Known Allergies       REVIEWED MEDICATIONS:        Current Outpatient Medications:     metoprolol tartrate (LOPRESSOR) 25 MG tablet, Take 1 tablet by mouth, Disp: , Rfl:     simvastatin (ZOCOR) 40 MG tablet, Take 1 tablet by mouth, Disp: , Rfl:     ELIQUIS 5 MG TABS tablet, Take 1 tablet by mouth 2 times daily, Disp: 180 tablet, Rfl: 3    atorvastatin (LIPITOR) 10 MG tablet, Take 10 mg by mouth daily, Disp: , Rfl:     losartan (COZAAR) 100 MG tablet, Take 100 mg by mouth daily, Disp: , Rfl:     Cholecalciferol (VITAMIN D3) 50 MCG (2000 UT) CAPS, Take 1 capsule by mouth daily, Disp: , Rfl:     aspirin 81 MG tablet, Take 81 mg by mouth daily, Disp: , Rfl:     amLODIPine (NORVASC) 10 MG tablet, Take 10 mg by mouth daily, Disp: , Rfl:       S: REASON FOR VISIT:       Chief Complaint   Patient presents with    New Patient     Establish cardiac care, no complaints, consulted 7/15/19          History of Present Illness:       Office Visit for follow up of A Fib, LVD   76 yr pt with PAF, LVD, HTN seen in hospital 7/2019   Had stress, DCCV 7/2019; Came for f/u visit   Patient is compliant with all medications   Tacho any exertional chest pain or short of breath   No palpitations, dizzy or syncope. Active at home   No orthopnea   Try to watch diet          Past Medical History:   Diagnosis Date    Atrial fibrillation ([facility] [address]     Hypertension           History reviewed. No pertinent surgical history.        Family History   Problem Relation Age of Onset    Heart Attack Mother           Social History     Tobacco Use    Smoking status: Former Smoker    Smokeless tobacco: Never Used   Substance Use Topics    Alcohol use: Yes     Comment: rarely         Review of Systems:  Constitutional: negative for fever and chills, or significant weight loss  HEENT: negative for acute visual symptoms or auditory problems, no

## 2020-05-23 ENCOUNTER — HOSPITAL ENCOUNTER (EMERGENCY)
Dept: HOSPITAL 83 - ED | Age: 75
Discharge: HOME | End: 2020-05-23
Payer: MEDICARE

## 2020-05-23 VITALS — WEIGHT: 220 LBS | HEIGHT: 70.98 IN | BODY MASS INDEX: 30.8 KG/M2

## 2020-05-23 DIAGNOSIS — I10: ICD-10-CM

## 2020-05-23 DIAGNOSIS — Z79.899: ICD-10-CM

## 2020-05-23 DIAGNOSIS — J86.9: Primary | ICD-10-CM

## 2020-05-23 DIAGNOSIS — E78.00: ICD-10-CM

## 2020-08-18 ENCOUNTER — OFFICE VISIT (OUTPATIENT)
Dept: CARDIOLOGY CLINIC | Age: 75
End: 2020-08-18
Payer: MEDICARE

## 2020-08-18 VITALS
HEART RATE: 65 BPM | SYSTOLIC BLOOD PRESSURE: 136 MMHG | HEIGHT: 71 IN | BODY MASS INDEX: 32.2 KG/M2 | WEIGHT: 230 LBS | RESPIRATION RATE: 18 BRPM | DIASTOLIC BLOOD PRESSURE: 76 MMHG

## 2020-08-18 PROCEDURE — 3017F COLORECTAL CA SCREEN DOC REV: CPT | Performed by: INTERNAL MEDICINE

## 2020-08-18 PROCEDURE — 93000 ELECTROCARDIOGRAM COMPLETE: CPT | Performed by: INTERNAL MEDICINE

## 2020-08-18 PROCEDURE — G8427 DOCREV CUR MEDS BY ELIG CLIN: HCPCS | Performed by: INTERNAL MEDICINE

## 2020-08-18 PROCEDURE — 4040F PNEUMOC VAC/ADMIN/RCVD: CPT | Performed by: INTERNAL MEDICINE

## 2020-08-18 PROCEDURE — G8417 CALC BMI ABV UP PARAM F/U: HCPCS | Performed by: INTERNAL MEDICINE

## 2020-08-18 PROCEDURE — 99214 OFFICE O/P EST MOD 30 MIN: CPT | Performed by: INTERNAL MEDICINE

## 2020-08-18 PROCEDURE — 1123F ACP DISCUSS/DSCN MKR DOCD: CPT | Performed by: INTERNAL MEDICINE

## 2020-08-18 PROCEDURE — 1036F TOBACCO NON-USER: CPT | Performed by: INTERNAL MEDICINE

## 2020-08-18 NOTE — PROGRESS NOTES
OFFICE VISIT     PRIMARY CARE PHYSICIAN:      Radha Alamo       ALLERGIES / SENSITIVITIES:      No Known Allergies       REVIEWED MEDICATIONS:        Current Outpatient Medications:     metoprolol tartrate (LOPRESSOR) 25 MG tablet, Take 1 tablet by mouth, Disp: , Rfl:     ELIQUIS 5 MG TABS tablet, Take 1 tablet by mouth 2 times daily, Disp: 180 tablet, Rfl: 3    atorvastatin (LIPITOR) 10 MG tablet, Take 10 mg by mouth daily, Disp: , Rfl:     losartan (COZAAR) 100 MG tablet, Take 100 mg by mouth daily, Disp: , Rfl:     Cholecalciferol (VITAMIN D3) 50 MCG (2000 UT) CAPS, Take 1 capsule by mouth daily, Disp: , Rfl:     aspirin 81 MG tablet, Take 81 mg by mouth daily, Disp: , Rfl:     amLODIPine (NORVASC) 10 MG tablet, Take 10 mg by mouth daily, Disp: , Rfl:       S: REASON FOR VISIT:       Chief Complaint   Patient presents with    6 Month Follow-Up     No cardiac complaints, last OV 3-12-20          History of Present Illness:       Office Visit for follow up of A Fib, CMP, HTN   76 yr pt with HX of A Fib, CMP, HTN came for f/u visit   No hospitalizations or surgeries since last visit   Patient is compliant with all medications   Tacho any exertional chest pain or short of breath   No palpitations, dizzy or syncope. Active at home   No orthopnea   Try to watch diet          Past Medical History:   Diagnosis Date    At[facility] [address] [address]     Hypertension           History reviewed. No pertinent surgical history.        Family History   Problem Relation Age of Onset    Heart Attack Mother           Social History     Tobacco Use    Smoking status: Former Smoker    Smokeless tobacco: Never Used   Substance Use Topics    Alcohol use: Yes     Comment: rarely         Review of Systems:  Constitutional: negative for fever and chills, or significant weight loss  HEENT: negative for acute visual symptoms or auditory problems, no dysphagia  Respiratory: negative for cough, wheezing, or hemoptysis  Cardiovascular: negative for chest pain, palpitations, and dyspnea  Gastrointestinal: negative for abdominal pain, diarrhea, nausea and vomiting  Endocrine: Negative for polyuria and polydyspsia  Genitourinary:negative for dysuria and hematuria  Derm: negative for rash and skin lesion(s)  Neurological: negative for tingling, numbness, weakness, seizures and tremors  Endocrine: negative for polydipsia and polyuria  Musculoskeletal: negative for pain or tenderness  Psychiatric: negative for anxiety, depression, or suicidal ideations         O:  COMPLETE PHYSICAL EXAM:       /76   Pulse 65   Resp 18   Ht 5' 11\" (1.803 m)   Wt 230 lb (104.3 kg)   BMI 32.08 kg/m²       General:   Patient alert, comfortable, no distress. Appears stated age. HEENT:    Pupils equal, no icterus, no nasal drainage, tongue moist.   Neck:              No masses, Thyroid not palpable. No elevated JVD, No carotid bruit. Chest:   Normal configuration, non tender. Lungs:   Clear to auscultation bilaterally, few scattered rhonchi. Cardiovascular:  Regular rhythm, 1/6 systolic murmur, No S3,  no palpable thrills,    Abdomen:  Soft, Non tender, Bowel sounds normal, no pulsatile abdominal aorta, no palpable masses. Extremities:  No edema. Distal pulses palpable. No cyanosis, no clubbing. Skin:   Good turgor, warm and dry, no cyanosis. Musculoskeletal: No joint swelling or deformity. Neuro:   Cranial nerves grossly intact; No focal neurologic deficit. Psych:   Alert, good mood and effect. REVIEW OF DIAGNOSTIC TESTS:        Electrocardiogram: NSR, PACs   SHORTY 7/16/19 - EF 45%            A/P:   ASSESSMENT / PLAN:    Neris Mora was seen today for 6 month follow-up. Diagnoses and all orders for this visit:    Paroxysmal atrial fibrillation ([address] - s/p DCCV on 7/16/2019 - Continue Eliquis  -     EKG 12 lead     LV dysfunction - EF 45%; Continue BB, ARB;  No acute CHF     Possible old inferior MI -  per stress test 7/2019    Essential hypertension - Controlled     Chronic kidney disease, unspecified CKD stage     Dyslipidemia - On Statin     Preventive Cardiology: Low cholesterol diet, regular exercise as tolerate, and gradual weight loss discussed. Monitor BP and heart rates. Above recommendations discussed with him. All questions answered about cardiac diagnoses and cardiac medications. Continue current medications. Compliance with medications and f/u with all physicians discussed. Risk factor modification based on risk profile discussed. Call if any exertional chest pain, short of breath, dizzy or palpitations   Follow up in 9 months or earlier if needed.          [facility]  [address] [address], [place], [address] [place] [address]  [phone]

## 2021-06-01 ENCOUNTER — OFFICE VISIT (OUTPATIENT)
Dept: CARDIOLOGY CLINIC | Age: 76
End: 2021-06-01
Payer: MEDICARE

## 2021-06-01 VITALS
RESPIRATION RATE: 18 BRPM | HEART RATE: 62 BPM | DIASTOLIC BLOOD PRESSURE: 70 MMHG | WEIGHT: 226 LBS | HEIGHT: 71 IN | SYSTOLIC BLOOD PRESSURE: 138 MMHG | BODY MASS INDEX: 31.64 KG/M2

## 2021-06-01 DIAGNOSIS — E78.5 DYSLIPIDEMIA: ICD-10-CM

## 2021-06-01 DIAGNOSIS — I48.0 PAROXYSMAL ATRIAL FIBRILLATION (HCC): ICD-10-CM

## 2021-06-01 DIAGNOSIS — I10 ESSENTIAL HYPERTENSION: Primary | ICD-10-CM

## 2021-06-01 PROCEDURE — G8417 CALC BMI ABV UP PARAM F/U: HCPCS | Performed by: INTERNAL MEDICINE

## 2021-06-01 PROCEDURE — 99214 OFFICE O/P EST MOD 30 MIN: CPT | Performed by: INTERNAL MEDICINE

## 2021-06-01 PROCEDURE — 93000 ELECTROCARDIOGRAM COMPLETE: CPT | Performed by: INTERNAL MEDICINE

## 2021-06-01 PROCEDURE — 1036F TOBACCO NON-USER: CPT | Performed by: INTERNAL MEDICINE

## 2021-06-01 PROCEDURE — 3017F COLORECTAL CA SCREEN DOC REV: CPT | Performed by: INTERNAL MEDICINE

## 2021-06-01 PROCEDURE — G8427 DOCREV CUR MEDS BY ELIG CLIN: HCPCS | Performed by: INTERNAL MEDICINE

## 2021-06-01 PROCEDURE — 1123F ACP DISCUSS/DSCN MKR DOCD: CPT | Performed by: INTERNAL MEDICINE

## 2021-06-01 PROCEDURE — 4040F PNEUMOC VAC/ADMIN/RCVD: CPT | Performed by: INTERNAL MEDICINE

## 2021-06-01 NOTE — PROGRESS NOTES
OFFICE VISIT     PRIMARY CARE PHYSICIAN:      Radha Alamo       ALLERGIES / SENSITIVITIES:      No Known Allergies       REVIEWED MEDICATIONS:        Current Outpatient Medications:     metoprolol tartrate (LOPRESSOR) 25 MG tablet, Take 1 tablet by mouth, Disp: , Rfl:     ELIQUIS 5 MG TABS tablet, Take 1 tablet by mouth 2 times daily, Disp: 180 tablet, Rfl: 3    atorvastatin (LIPITOR) 10 MG tablet, Take 10 mg by mouth daily, Disp: , Rfl:     losartan (COZAAR) 100 MG tablet, Take 100 mg by mouth daily, Disp: , Rfl:     Cholecalciferol (VITAMIN D3) 50 MCG (2000 UT) CAPS, Take 1 capsule by mouth daily, Disp: , Rfl:     amLODIPine (NORVASC) 10 MG tablet, Take 10 mg by mouth daily, Disp: , Rfl:       S: REASON FOR VISIT:       Chief Complaint   Patient presents with    Follow-up     No cardiac complaints, last OV 8-          History of Present Illness:       Office Visit for follow up of Hypertension, A Fib, CMP   76 yr old Nabila Corbin with history of PAF, CMP,HTN came for f/u visit   No hospitalizations or surgeries since last visit   Patient is compliant with all medications   Tacho any exertional chest pain or short of breath   No palpitations, dizzy or syncope. Active at home   No orthopnea   Try to watch diet          Past Medical History:   Diagnosis Date    At[facility] fibrillation ([facility] [address]     Hypertension           History reviewed. No pertinent surgical history.        Family History   Problem Relation Age of Onset    Heart Attack Mother           Social History     Tobacco Use    Smoking status: Former Smoker    Smokeless tobacco: Never Used   Substance Use Topics    Alcohol use: Yes     Comment: rarely         Review of Systems:  Constitutional: negative for fever and chills, or significant weight loss  HEENT: negative for acute visual symptoms or auditory problems, no dysphagia  Respiratory: negative for cough, wheezing, or hemoptysis  Cardiovascular: negative for chest pain, palpitations, and dyspnea  Gastrointestinal: negative for abdominal pain, diarrhea, nausea and vomiting  Endocrine: Negative for polyuria and polydyspsia  Genitourinary:negative for dysuria and hematuria  Derm: negative for rash and skin lesion(s)  Neurological: negative for tingling, numbness, weakness, seizures and tremors  Endocrine: negative for polydipsia and polyuria  Musculoskeletal: negative for pain or tenderness  Psychiatric: negative for anxiety, depression, or suicidal ideations         O:  COMPLETE PHYSICAL EXAM:       /70   Pulse 62   Resp 18   Ht 5' 11\" (1.803 m)   Wt 226 lb (102.5 kg)   BMI 31.52 kg/m²       General:   Patient alert, comfortable, no distress. Appears stated age. HEENT:    Pupils equal, no icterus, no nasal drainage, tongue moist.   Neck:              No masses, Thyroid not palpable. No elevated JVD, No carotid bruit. Chest:   Normal configuration, non tender. Lungs:   Clear to auscultation bilaterally, few scattered rhonchi. Cardiovascular:  Regular rhythm, 1/6 systolic murmur, No S3, no palpable thrills,    Abdomen:  Soft, Non tender, Bowel sounds normal, no pulsatile abdominal aorta, no palpable masses. Extremities:  No edema. Distal pulses palpable. No cyanosis, no clubbing. Skin:   Good turgor, warm and dry, no cyanosis. Musculoskeletal: No joint swelling or deformity. Neuro:   Cranial nerves grossly intact; No focal neurologic deficit. Psych:   Alert, good mood and effect. REVIEW OF DIAGNOSTIC TESTS:        Electrocardiogram: NSR, LAFB, Normal QTc interval   SHORTY 7/16/19 - EF 45%            A/P:   ASSESSMENT / PLAN:    Terrie Bernheim was seen today for follow-up. Diagnoses and all orders for this visit:    Paroxysmal atrial fibrillation (HCC) - s/p DCCV on 7/16/2019 - In sinus, High [id number] VASc scaore Continue Eliquis for [address]  -     EKG 12 lead     LV dysfunction - EF 45% by SHORTY in 7/2019; Continue BB, ARB;  No acute CHF - Get Echo after next visit     Possible old inferior MI -  per stress test in 7/2019 - No chest pain     Essential hypertension - Controlled     Chronic kidney disease, unspecified CKD stage     Dyslipidemia - On Statin    Preventive Cardiology: Low cholesterol diet, regular exercise as tolerate, and gradual weight loss discussed. Monitor BP and heart rates. Above recommendations discussed with him   All questions answered about cardiac diagnoses and cardiac medications. Continue current medications. Compliance with medications and f/u with all physicians discussed. Risk factor modification based on risk profile discussed. Call if any exertional chest pain, short of breath, dizzy or palpitations   Follow up in 9 months or earlier if needed.          [facility]  [address], [place], [address] [facility]  [phone]

## 2022-03-10 ENCOUNTER — OFFICE VISIT (OUTPATIENT)
Dept: CARDIOLOGY CLINIC | Age: 77
End: 2022-03-10
Payer: MEDICARE

## 2022-03-10 VITALS
HEIGHT: 71 IN | DIASTOLIC BLOOD PRESSURE: 72 MMHG | SYSTOLIC BLOOD PRESSURE: 138 MMHG | HEART RATE: 67 BPM | BODY MASS INDEX: 29.68 KG/M2 | WEIGHT: 212 LBS | RESPIRATION RATE: 18 BRPM

## 2022-03-10 DIAGNOSIS — E78.5 DYSLIPIDEMIA: ICD-10-CM

## 2022-03-10 DIAGNOSIS — I51.9 LV DYSFUNCTION: ICD-10-CM

## 2022-03-10 DIAGNOSIS — I48.0 PAROXYSMAL ATRIAL FIBRILLATION (HCC): Primary | ICD-10-CM

## 2022-03-10 DIAGNOSIS — I10 ESSENTIAL HYPERTENSION: ICD-10-CM

## 2022-03-10 DIAGNOSIS — I31.39 PERICARDIAL EFFUSION: ICD-10-CM

## 2022-03-10 PROBLEM — Z79.01 LONG TERM (CURRENT) USE OF ANTICOAGULANTS: Status: ACTIVE | Noted: 2022-03-10

## 2022-03-10 PROBLEM — D07.5 CARCINOMA IN SITU OF PROSTATE: Status: ACTIVE | Noted: 2022-03-10

## 2022-03-10 PROBLEM — R53.81 PHYSICAL DECONDITIONING: Status: ACTIVE | Noted: 2022-03-10

## 2022-03-10 PROBLEM — C61 CARCINOMA OF PROSTATE (HCC): Status: ACTIVE | Noted: 2022-03-10

## 2022-03-10 PROCEDURE — 99214 OFFICE O/P EST MOD 30 MIN: CPT | Performed by: INTERNAL MEDICINE

## 2022-03-10 PROCEDURE — G8427 DOCREV CUR MEDS BY ELIG CLIN: HCPCS | Performed by: INTERNAL MEDICINE

## 2022-03-10 PROCEDURE — G8484 FLU IMMUNIZE NO ADMIN: HCPCS | Performed by: INTERNAL MEDICINE

## 2022-03-10 PROCEDURE — G8417 CALC BMI ABV UP PARAM F/U: HCPCS | Performed by: INTERNAL MEDICINE

## 2022-03-10 PROCEDURE — 1036F TOBACCO NON-USER: CPT | Performed by: INTERNAL MEDICINE

## 2022-03-10 PROCEDURE — 93000 ELECTROCARDIOGRAM COMPLETE: CPT | Performed by: INTERNAL MEDICINE

## 2022-03-10 PROCEDURE — 1123F ACP DISCUSS/DSCN MKR DOCD: CPT | Performed by: INTERNAL MEDICINE

## 2022-03-10 PROCEDURE — 4040F PNEUMOC VAC/ADMIN/RCVD: CPT | Performed by: INTERNAL MEDICINE

## 2022-03-10 NOTE — PROGRESS NOTES
OFFICE VISIT     PRIMARY CARE PHYSICIAN:      Radha Alamo       ALLERGIES / SENSITIVITIES:      No Known Allergies       REVIEWED MEDICATIONS:        Current Outpatient Medications:     metoprolol tartrate (LOPRESSOR) 25 MG tablet, Take 1 tablet by mouth, Disp: , Rfl:     ELIQUIS 5 MG TABS tablet, Take 1 tablet by mouth 2 times daily, Disp: 180 tablet, Rfl: 3    atorvastatin (LIPITOR) 10 MG tablet, Take 10 mg by mouth daily, Disp: , Rfl:     losartan (COZAAR) 100 MG tablet, Take 100 mg by mouth daily, Disp: , Rfl:     Cholecalciferol (VITAMIN D3) 50 MCG (2000 UT) CAPS, Take 1 capsule by mouth daily, Disp: , Rfl:     amLODIPine (NORVASC) 10 MG tablet, Take 10 mg by mouth daily, Disp: , Rfl:       S: REASON FOR VISIT:       Chief Complaint   Patient presents with    Follow-up     Cardiac check up, no cardiac complaints          History of Present Illness:       Office Visit for follow up of Hypertension, A Fib, CMP, HTN              68 yr old Gretta Newell with history of PAF, CMP,HTN came for f/u visit   No hospitalizations or surgeries since last visit   Patient is compliant with all medications   Tacho any exertional chest pain or short of breath   No palpitations, dizzy or syncope. Active at home   No orthopnea   Try to watch diet          Past Medical History:   Diagnosis Date    Atrial fibrillation ([facility] [address]     Hypertension           History reviewed. No pertinent surgical history.        Family History   Problem Relation Age of Onset    Heart Attack Mother           Social History     Tobacco Use    Smoking status: Former Smoker    Smokeless tobacco: Never Used   Substance Use Topics    Alcohol use: Yes     Comment: rarely         Review of Systems:  Constitutional: negative for fever and chills, or significant weight loss  HEENT: negative for acute visual symptoms or auditory problems, no dysphagia  Respiratory: negative for cough, wheezing, or hemoptysis  Cardiovascular: negative for chest pain, palpitations, and dyspnea  Gastrointestinal: negative for abdominal pain, diarrhea, nausea and vomiting  Endocrine: Negative for polyuria and polydyspsia  Genitourinary:negative for dysuria and hematuria  Derm: negative for rash and skin lesion(s)  Neurological: negative for tingling, numbness, weakness, seizures and tremors  Endocrine: negative for polydipsia and polyuria  Musculoskeletal: negative for pain or tenderness  Psychiatric: negative for anxiety, depression, or suicidal ideations         O:  COMPLETE PHYSICAL EXAM:       /72   Pulse 67   Resp 18   Ht 5' 11\" (1.803 m)   Wt 212 lb (96.2 kg)   BMI 29.57 kg/m²       General:   Patient alert, comfortable, no distress. Appears stated age. HEENT:    Pupils equal, no icterus, no nasal drainage, tongue moist.   Neck:              No masses, Thyroid not palpable. No elevated JVD, No carotid bruit. Chest:   Normal configuration, non tender. Lungs:   Clear to auscultation bilaterally, few scattered rhonchi. Cardiovascular:  Regular rhythm, 1/6 systolic murmur, No S3, PMI at 5th ICS   Abdomen:  Soft, Non tender, Bowel sounds normal, no pulsatile abdominal aorta   Extremities:  No edema. Distal pulses palpable. No cyanosis, no clubbing. Skin:   Good turgor, warm and dry, no cyanosis. Musculoskeletal: No joint swelling or deformity. Neuro:   Cranial nerves grossly intact; No focal neurologic deficit. Psych:   Alert, good mood and effect. REVIEW OF DIAGNOSTIC TESTS:        Electrocardiogram:  Reviewed       Stress test: 7/16/2019  Fixed inferior wall defect.        Mild inferior hypokinesis, EF 40-45%. SHORTY 7/16/2019 - EF 45%, small pericardial effusion         A/P:   ASSESSMENT / PLAN:    Francie Conte was seen today for follow-up.     Diagnoses and all orders for this visit:       Paroxysmal atrial fibrillation (HCC) - s/p DCCV on 7/16/2019 - In sinus, High [id number] VASc score Continue Eliquis for OAC  -     EKG 12 lead     LV dysfunction - EF 45% by SHORTY in 7/2019; Continue BB, ARB; No acute CHF - Get Echo after next visit   -     Echo 2D w doppler w color complete; Future    Pericardial effusion - Small by SHORTY 2019  -     Echo 2D w doppler w color complete; Future    Fixed inferior wall defect per stress test in 7/2019 - No chest pain     Essential hypertension - Controlled     Chronic kidney disease, unspecified CKD stage     Dyslipidemia - On Statin     Other orders  -     EKG 12 lead    Preventive Cardiology: Low cholesterol diet, regular exercise as tolerate, and gradual weight loss discussed.                 Monitor BP and heart rates. Above recommendations discussed with him   All questions answered about cardiac diagnoses and cardiac medications. Continue current medications. Compliance with medications and f/u with all physicians discussed. Risk factor modification based on risk profile discussed. Call if any exertional chest pain, short of breath, dizzy or palpitations   Follow up in 6-9 months or earlier if needed.          [facility]  [address], [place], [facility]  [phone]

## 2022-03-29 ENCOUNTER — HOSPITAL ENCOUNTER (OUTPATIENT)
Dept: HOSPITAL 83 - CARD | Age: 77
Discharge: HOME | End: 2022-03-29
Attending: INTERNAL MEDICINE
Payer: MEDICARE

## 2022-03-29 DIAGNOSIS — I31.3: ICD-10-CM

## 2022-03-29 DIAGNOSIS — E51.9: ICD-10-CM

## 2022-03-29 DIAGNOSIS — I37.1: Primary | ICD-10-CM

## 2022-03-29 DIAGNOSIS — I48.0: ICD-10-CM

## 2022-03-29 DIAGNOSIS — I51.9: ICD-10-CM

## 2022-03-29 DIAGNOSIS — R94.31: ICD-10-CM

## 2022-03-29 DIAGNOSIS — I11.9: ICD-10-CM

## 2022-12-27 ENCOUNTER — OFFICE VISIT (OUTPATIENT)
Dept: CARDIOLOGY CLINIC | Age: 77
End: 2022-12-27
Payer: MEDICARE

## 2022-12-27 VITALS
WEIGHT: 224 LBS | HEIGHT: 71 IN | RESPIRATION RATE: 18 BRPM | DIASTOLIC BLOOD PRESSURE: 76 MMHG | HEART RATE: 69 BPM | SYSTOLIC BLOOD PRESSURE: 136 MMHG | BODY MASS INDEX: 31.36 KG/M2

## 2022-12-27 DIAGNOSIS — E78.5 DYSLIPIDEMIA: ICD-10-CM

## 2022-12-27 DIAGNOSIS — I48.0 PAROXYSMAL ATRIAL FIBRILLATION (HCC): Primary | ICD-10-CM

## 2022-12-27 DIAGNOSIS — I10 ESSENTIAL HYPERTENSION: ICD-10-CM

## 2022-12-27 DIAGNOSIS — I36.1 NONRHEUMATIC TRICUSPID VALVE REGURGITATION: ICD-10-CM

## 2022-12-27 PROBLEM — Z71.89 COUNSELING ON SUBSTANCE USE AND ABUSE: Status: ACTIVE | Noted: 2022-12-27

## 2022-12-27 PROCEDURE — 1036F TOBACCO NON-USER: CPT | Performed by: INTERNAL MEDICINE

## 2022-12-27 PROCEDURE — 99214 OFFICE O/P EST MOD 30 MIN: CPT | Performed by: INTERNAL MEDICINE

## 2022-12-27 PROCEDURE — 93000 ELECTROCARDIOGRAM COMPLETE: CPT | Performed by: INTERNAL MEDICINE

## 2022-12-27 PROCEDURE — G8427 DOCREV CUR MEDS BY ELIG CLIN: HCPCS | Performed by: INTERNAL MEDICINE

## 2022-12-27 PROCEDURE — 3074F SYST BP LT 130 MM HG: CPT | Performed by: INTERNAL MEDICINE

## 2022-12-27 PROCEDURE — 3078F DIAST BP <80 MM HG: CPT | Performed by: INTERNAL MEDICINE

## 2022-12-27 PROCEDURE — G8484 FLU IMMUNIZE NO ADMIN: HCPCS | Performed by: INTERNAL MEDICINE

## 2022-12-27 PROCEDURE — G8417 CALC BMI ABV UP PARAM F/U: HCPCS | Performed by: INTERNAL MEDICINE

## 2022-12-27 PROCEDURE — 1123F ACP DISCUSS/DSCN MKR DOCD: CPT | Performed by: INTERNAL MEDICINE

## 2022-12-27 NOTE — PROGRESS NOTES
OFFICE VISIT     PRIMARY CARE PHYSICIAN:      Radha Alamo       ALLERGIES / SENSITIVITIES:      No Known Allergies       REVIEWED MEDICATIONS:        Current Outpatient Medications:     metoprolol tartrate (LOPRESSOR) 25 MG tablet, Take 1 tablet by mouth, Disp: , Rfl:     ELIQUIS 5 MG TABS tablet, Take 1 tablet by mouth 2 times daily, Disp: 180 tablet, Rfl: 3    atorvastatin (LIPITOR) 10 MG tablet, Take 10 mg by mouth daily, Disp: , Rfl:     losartan (COZAAR) 100 MG tablet, Take 100 mg by mouth daily, Disp: , Rfl:     Cholecalciferol (VITAMIN D3) 50 MCG (2000 UT) CAPS, Take 1 capsule by mouth daily, Disp: , Rfl:     amLODIPine (NORVASC) 10 MG tablet, Take 10 mg by mouth daily, Disp: , Rfl:       S: REASON FOR VISIT:       Chief Complaint   Patient presents with    Follow-up     Cardiac check up, no cardiac complaints, last OV 03/10/2022    Atrial Fibrillation    Hypertension          History of Present Illness:       Office Visit for follow up of Hypertension, A Fib, CMP, HTN              68 yr old Charly Boyle with history of PAF, CMP, HTN came for f/u visit    No hospitalizations or surgeries since last visit   Patient is compliant with all medications   Tacho any exertional chest pain or short of breath   No palpitations, dizzy or syncope. Active at home   Try to watch diet          Past Medical History:   Diagnosis Date    Atrial fibrillation ([facility] [address]     Hypertension           No past surgical history on file.        Family History   Problem Relation Age of Onset    Heart Attack Mother           Social History     Tobacco Use    Smoking status: Former    Smokeless tobacco: Never   Substance Use Topics    Alcohol use: Yes     Comment: rarely         Review of Systems:    Constitutional: negative for fever and chills, or significant weight loss  HEENT: negative for acute visual symptoms or auditory problems, no dysphagia  Respiratory: negative for cough, wheezing, or hemoptysis  Cardiovascular: negative for chest pain, palpitations, and dyspnea  Gastrointestinal: negative for abdominal pain, diarrhea, melena, nausea and vomiting  Endocrine: Negative for polyuria and polydyspsia  Genitourinary: negative for dysuria and hematuria  Derm: negative for rash and skin lesion(s)  Neurological: negative for tingling, numbness, weakness, seizures  Endocrine: negative for polydipsia and polyuria  Musculoskeletal: negative for pain or tenderness  Psychiatric: negative for anxiety, depression, or suicidal ideations         O:  COMPLETE PHYSICAL EXAM:       /76   Pulse 69   Resp 18   Ht 5' 11\" (1.803 m)   Wt 224 lb (101.6 kg)   BMI 31.24 kg/m²       General:   Patient alert, comfortable, no distress. Appears stated age. HEENT:    Pupils equal, no icterus; Tongue moist.   Neck:              No masses, Thyroid not palpable. No elevated JVD, No carotid bruit. Chest:   Normal configuration, non tender. Lungs:   Clear to auscultation bilaterally except few scattered rhonchi. Cardiovascular:  Regular rhythm, 2/6 systolic murmur, No S3, no palpable thrills. Abdomen:  Soft, Bowel sounds normal, no pulsatile abdominal aorta, no palpable masses. Extremities:  No edema. Distal pulses palpable. No cyanosis, no clubbing. Skin:   Good turgor, warm and dry, no cyanosis. Musculoskeletal: No joint swelling or deformity. Neuro:   Cranial nerves grossly intact; No focal neurologic deficit. Psych:   Alert, good mood and effect. REVIEW OF DIAGNOSTIC TESTS:        Electrocardiogram: Reviewed     Echo - March 2022    <Conclusion>        The left ventricle is normal size. LVEF is 60-65%. Grade I - abnormal relaxation parttern. The right ventricular systolic function is normal.        The left atrium size is normal.        The mitral valve is normal in structure. Mild tricuspid regurgitation. Trace to mild pulmonic regurgitation.        Stress test: 7/16/2019  Fixed inferior wall defect. Mild inferior hypokinesis, EF 40-45%. SHORTY 7/16/2019 - EF 45%, small pericardial effusion             ASSESSMENT / PLAN:    Noni Brink was seen today for follow-up. Diagnoses and all orders for this visit:       Paroxysmal atrial fibrillation ([address] - s/p DCCV on 7/16/2019 - In sinus, High [id number] VASc score; Continue Eliquis for OAC  -     EKG 12 lead     LV dysfunction - Resolved, EF 60-65%, Continue BB, ARB; No acute CHF   -     EKG 12 lead    Hx of Pericardial effusion - Resolved by Echo 3/2022 (Small by SHORTY 2019)     Fixed inferior wall defect per stress test in 7/2019 - No chest pain     Essential hypertension - Controlled - Low salt diet discussed   -     EKG 12 lead    Tricuspid regurgitation - Mild    Chronic kidney disease, unspecified CKD stage     Dyslipidemia - On Statin    Preventive Cardiology: Low cholesterol diet, regular exercise as tolerate, and gradual weight loss discussed. Echo results and above recommendations discussed. The patient's current medication list, allergies, problem list and results of prior tests (as available) were reviewed at today's visit   All questions answered about cardiac diagnoses and cardiac medications. Continue current medications. Monitor BP and heart rates. Compliance with medications and f/u with all physicians discussed. Risk factor modification based on risk profile discussed. Call if any exertional chest pain, short of breath, dizzy or palpitations. Follow up in 9 months or earlier if needed.          [facility]  [address], [place], [address] [facility]  [phone]

## 2023-10-31 PROBLEM — R94.31 ABNORMAL ELECTROCARDIOGRAM (ECG) (EKG): Status: ACTIVE | Noted: 2022-03-29

## 2023-10-31 PROBLEM — I11.9 HYPERTENSIVE HEART DISEASE WITHOUT HEART FAILURE: Status: ACTIVE | Noted: 2022-03-29

## 2023-10-31 PROBLEM — I51.9 HEART DISEASE, UNSPECIFIED: Status: ACTIVE | Noted: 2020-03-11

## 2023-10-31 PROBLEM — I37.1 NONRHEUMATIC PULMONARY VALVE INSUFFICIENCY: Status: ACTIVE | Noted: 2022-03-29

## 2023-10-31 PROBLEM — I31.39 PERICARDIAL EFFUSION (NONINFLAMMATORY): Status: ACTIVE | Noted: 2022-03-29

## 2023-10-31 PROBLEM — E51.9 THIAMINE DEFICIENCY, UNSPECIFIED: Status: ACTIVE | Noted: 2022-03-29

## 2023-10-31 PROBLEM — I48.0 PAROXYSMAL ATRIAL FIBRILLATION (HCC): Status: ACTIVE | Noted: 2020-03-11

## 2023-11-01 ENCOUNTER — OFFICE VISIT (OUTPATIENT)
Dept: CARDIOLOGY CLINIC | Age: 78
End: 2023-11-01

## 2023-11-01 VITALS
HEIGHT: 71 IN | WEIGHT: 223 LBS | SYSTOLIC BLOOD PRESSURE: 129 MMHG | BODY MASS INDEX: 31.22 KG/M2 | RESPIRATION RATE: 18 BRPM | HEART RATE: 64 BPM | DIASTOLIC BLOOD PRESSURE: 77 MMHG

## 2023-11-01 DIAGNOSIS — I36.1 NONRHEUMATIC TRICUSPID VALVE REGURGITATION: ICD-10-CM

## 2023-11-01 DIAGNOSIS — I48.0 PAROXYSMAL ATRIAL FIBRILLATION (HCC): ICD-10-CM

## 2023-11-01 DIAGNOSIS — I10 ESSENTIAL HYPERTENSION: Primary | ICD-10-CM

## 2023-11-01 DIAGNOSIS — N18.9 CHRONIC KIDNEY DISEASE, UNSPECIFIED CKD STAGE: ICD-10-CM

## 2023-11-01 RX ORDER — MEGESTROL ACETATE 20 MG/1
20 TABLET ORAL DAILY
COMMUNITY
Start: 2023-09-19

## 2023-11-01 RX ORDER — BICALUTAMIDE 50 MG/1
50 TABLET, FILM COATED ORAL DAILY
COMMUNITY
Start: 2023-10-05

## 2023-11-01 NOTE — PROGRESS NOTES
OFFICE VISIT     PRIMARY CARE PHYSICIAN:      Radha Alamo       ALLERGIES / SENSITIVITIES:      No Known Allergies       REVIEWED MEDICATIONS:        Current Outpatient Medications:     megestrol (MEGACE) 20 MG tablet, Take 1 tablet by mouth daily, Disp: , Rfl:     bicalutamide (CASODEX) 50 MG chemo tablet, Take 1 tablet by mouth daily, Disp: , Rfl:     metoprolol tartrate (LOPRESSOR) 25 MG tablet, Take 1 tablet by mouth, Disp: , Rfl:     ELIQUIS 5 MG TABS tablet, Take 1 tablet by mouth 2 times daily, Disp: 180 tablet, Rfl: 3    atorvastatin (LIPITOR) 10 MG tablet, Take 1 tablet by mouth daily, Disp: , Rfl:     losartan (COZAAR) 100 MG tablet, Take 1 tablet by mouth daily, Disp: , Rfl:     Cholecalciferol (VITAMIN D3) 50 MCG (2000 UT) CAPS, Take 1 capsule by mouth daily, Disp: , Rfl:     amLODIPine (NORVASC) 10 MG tablet, Take 1 tablet by mouth daily, Disp: , Rfl:       S: REASON FOR VISIT:       Chief Complaint   Patient presents with    Follow-up     Cardiac check up, no cardiac complaints. Last OV 12/27/2022          History of Present Illness:       Office Visit for follow up of Hypertension, A Fib, CMP, HTN              66 yr old Alden Quinn with history of PAF, CMP, HTN came for f/u visit     No hospitalizations or surgeries since last visit   Patient is compliant with all medications   Tacho any exertional chest pain or short of breath   No palpitations, dizzy or syncope. Active at home   Try to watch diet          Past Medical History:   Diagnosis Date    Atrial fibrillation ([address])     Hypertension           No past surgical history on file.        Family History   Problem Relation Age of Onset    Heart Attack Mother           Social History     Tobacco Use    Smoking status: Former    Smokeless tobacco: Never   Substance Use Topics    Alcohol use: Yes     Comment: rarely         Review of Systems:    Constitutional: negative for fever and chills, or significant weight loss  HEENT: negative for

## 2024-06-27 ENCOUNTER — OFFICE VISIT (OUTPATIENT)
Dept: CARDIOLOGY CLINIC | Age: 79
End: 2024-06-27
Payer: MEDICARE

## 2024-06-27 VITALS
SYSTOLIC BLOOD PRESSURE: 136 MMHG | RESPIRATION RATE: 18 BRPM | DIASTOLIC BLOOD PRESSURE: 78 MMHG | HEIGHT: 71 IN | WEIGHT: 229 LBS | HEART RATE: 63 BPM | BODY MASS INDEX: 32.06 KG/M2

## 2024-06-27 DIAGNOSIS — I48.0 PAROXYSMAL ATRIAL FIBRILLATION (HCC): Primary | ICD-10-CM

## 2024-06-27 DIAGNOSIS — I10 ESSENTIAL HYPERTENSION: ICD-10-CM

## 2024-06-27 DIAGNOSIS — E78.5 DYSLIPIDEMIA: ICD-10-CM

## 2024-06-27 DIAGNOSIS — I36.1 NONRHEUMATIC TRICUSPID VALVE REGURGITATION: ICD-10-CM

## 2024-06-27 PROCEDURE — 3075F SYST BP GE 130 - 139MM HG: CPT | Performed by: INTERNAL MEDICINE

## 2024-06-27 PROCEDURE — 99214 OFFICE O/P EST MOD 30 MIN: CPT | Performed by: INTERNAL MEDICINE

## 2024-06-27 PROCEDURE — G8417 CALC BMI ABV UP PARAM F/U: HCPCS | Performed by: INTERNAL MEDICINE

## 2024-06-27 PROCEDURE — 1123F ACP DISCUSS/DSCN MKR DOCD: CPT | Performed by: INTERNAL MEDICINE

## 2024-06-27 PROCEDURE — G8427 DOCREV CUR MEDS BY ELIG CLIN: HCPCS | Performed by: INTERNAL MEDICINE

## 2024-06-27 PROCEDURE — 3078F DIAST BP <80 MM HG: CPT | Performed by: INTERNAL MEDICINE

## 2024-06-27 PROCEDURE — 93000 ELECTROCARDIOGRAM COMPLETE: CPT | Performed by: INTERNAL MEDICINE

## 2024-06-27 PROCEDURE — 1036F TOBACCO NON-USER: CPT | Performed by: INTERNAL MEDICINE

## 2024-06-27 NOTE — PROGRESS NOTES
45%, small pericardial effusion           ASSESSMENT / PLAN:    Kamlesh was seen today for atrial fibrillation.    Diagnoses and all orders for this visit:    Paroxysmal atrial fibrillation (HCC) - s/p DCCV on 7/16/2019 - High [id number] VASc score; Continue Eliquis for OAC  -     EKG 12 lead     LV dysfunction - Resolved, EF 60-65%, Continue BB, ARB; No acute CHF   -     EKG 12 lead     Hx of Pericardial effusion - Resolved by Echo 3/2022 (Small by SHORTY 2019)     Fixed inferior wall defect per stress test in 7/2019 - No chest pain     Essential hypertension - Controlled - Low salt diet discussed   -     EKG 12 lead     Tricuspid regurgitation - Mild     Chronic kidney disease, unspecified CKD stage - Per Dr lAamo     Dyslipidemia - On Statin; Dr Alamo monitor labs     Preventive Cardiology: Low cholesterol diet, regular exercise as tolerate, and gradual weight loss discussed.     Other orders  -     apixaban (ELIQUIS) 5 MG TABS tablet; Take 1 tablet by mouth 2 times daily        Above recommendations discussed.   Current medications, allergies, and results of prior tests (as available) were reviewed.   All questions answered about cardiac diagnoses and cardiac medications.   Continue current medications. Monitor BP and heart rates.              Compliance with medications and f/u with physicians discussed.   Risk factor modification based on risk profile discussed.   Advised to call for exertional chest pains, short of breath; dizzy or palpitations.   Follow up in 6-9 months or earlier if needed.         [facility] Cardiology  [address] [place] [address]  [phone]

## 2025-01-07 ENCOUNTER — HOSPITAL ENCOUNTER (EMERGENCY)
Dept: HOSPITAL 83 - ED | Age: 80
Discharge: HOME | End: 2025-01-07
Payer: MEDICARE

## 2025-01-07 VITALS — WEIGHT: 233 LBS | HEIGHT: 70.98 IN | BODY MASS INDEX: 32.62 KG/M2

## 2025-01-07 DIAGNOSIS — Z79.899: ICD-10-CM

## 2025-01-07 DIAGNOSIS — Z79.2: ICD-10-CM

## 2025-01-07 DIAGNOSIS — H11.31: Primary | ICD-10-CM

## 2025-01-07 DIAGNOSIS — Z79.82: ICD-10-CM

## 2025-01-07 DIAGNOSIS — Z87.891: ICD-10-CM
